# Patient Record
Sex: MALE | Race: WHITE
[De-identification: names, ages, dates, MRNs, and addresses within clinical notes are randomized per-mention and may not be internally consistent; named-entity substitution may affect disease eponyms.]

---

## 2020-11-20 ENCOUNTER — HOSPITAL ENCOUNTER (INPATIENT)
Dept: HOSPITAL 35 - SBH | Age: 85
LOS: 12 days | Discharge: ANOTHER HOSPITAL | DRG: 884 | End: 2020-12-02
Attending: PSYCHIATRY & NEUROLOGY | Admitting: PSYCHIATRY & NEUROLOGY
Payer: COMMERCIAL

## 2020-11-20 VITALS — SYSTOLIC BLOOD PRESSURE: 160 MMHG | DIASTOLIC BLOOD PRESSURE: 90 MMHG

## 2020-11-20 VITALS — BODY MASS INDEX: 21.38 KG/M2 | HEIGHT: 71 IN | WEIGHT: 152.7 LBS

## 2020-11-20 DIAGNOSIS — U07.1: ICD-10-CM

## 2020-11-20 DIAGNOSIS — I10: ICD-10-CM

## 2020-11-20 DIAGNOSIS — N39.0: ICD-10-CM

## 2020-11-20 DIAGNOSIS — F32.9: ICD-10-CM

## 2020-11-20 DIAGNOSIS — F03.91: Primary | ICD-10-CM

## 2020-11-20 DIAGNOSIS — R33.9: ICD-10-CM

## 2020-11-20 DIAGNOSIS — G47.00: ICD-10-CM

## 2020-11-20 DIAGNOSIS — F41.9: ICD-10-CM

## 2020-11-20 DIAGNOSIS — Z79.82: ICD-10-CM

## 2020-11-20 DIAGNOSIS — Z79.899: ICD-10-CM

## 2020-11-20 PROCEDURE — 10880: CPT

## 2020-11-20 NOTE — EKG
[facility]
[place]   [address]                     ELECTROCARDIOGRAM REPORT      
_______________________________________________________________________________
 
Name:       DARRICK COLON              Room #:                     PRE   
M.R.#:      [id number]                       Account #:      [id number]  
Admission:              Attend Phys:                          
Discharge:              Date of Birth:  [birth year]  
                                                          Report #: [id number]
                                                                    [id number]
_______________________________________________________________________________
THIS REPORT FOR:  
 
cc:                                
                                   
     Mervin Cedeño MD [facility]                                         ~
THIS REPORT FOR:   //name//                          
 
                         [facility] ED
                                       
Test Date:    2020               Test Time:    15:44:19
Pat Name:     DARRICK COLON         Department:   
Patient ID:   [id number]            Room:          
Gender:       M                        Technician:   
:          [id number]               Requested By: Theron Dong
Order Number: [id number] MD:   Mervin Cedeño
                                 Measurements
Intervals                              Axis          
Rate:         66                       P:            22
PA:           190                      QRS:          -37
QRSD:         115                      T:            25
QT:           439                                    
QTc:          460                                    
                           Interpretive Statements
Sinus rhythm
Nonspecific IVCD with LAD
Left ventricular hypertrophy
Artifact in lead(s) II,aVR,aVF,V1,V2,V3,V4,V5,V6
No previous ECG available for comparison
Electronically Signed On 2020 16:28:15 CST by Mervin Cedeño
[email or web address]
 
 
 
 
 
 
 
 
 
 
 
 
 
 
  <ELECTRONICALLY SIGNED>
   By: Mervin Cedeño MD, FACC    
  20     1628
D: 20 154                           _____________________________________
T: 20 154                           Mervin Cedeño MD, [facility]      /EPI

## 2020-11-21 VITALS — SYSTOLIC BLOOD PRESSURE: 148 MMHG | DIASTOLIC BLOOD PRESSURE: 92 MMHG

## 2020-11-21 VITALS — DIASTOLIC BLOOD PRESSURE: 97 MMHG | SYSTOLIC BLOOD PRESSURE: 164 MMHG

## 2020-11-21 VITALS — DIASTOLIC BLOOD PRESSURE: 85 MMHG | SYSTOLIC BLOOD PRESSURE: 122 MMHG

## 2020-11-21 LAB
ANION GAP SERPL CALC-SCNC: 12 MMOL/L (ref 7–16)
BUN SERPL-MCNC: 13 MG/DL (ref 7–18)
CALCIUM SERPL-MCNC: 8.6 MG/DL (ref 8.5–10.1)
CHLORIDE SERPL-SCNC: 101 MMOL/L (ref 98–107)
CO2 SERPL-SCNC: 24 MMOL/L (ref 21–32)
CREAT SERPL-MCNC: 1.1 MG/DL (ref 0.7–1.3)
ERYTHROCYTE [DISTWIDTH] IN BLOOD BY AUTOMATED COUNT: 13.8 % (ref 10.5–14.5)
GLUCOSE SERPL-MCNC: 94 MG/DL (ref 74–106)
HCT VFR BLD CALC: 35.8 % (ref 42–52)
HGB BLD-MCNC: 12 GM/DL (ref 14–18)
MCH RBC QN AUTO: 31 PG (ref 26–34)
MCHC RBC AUTO-ENTMCNC: 33.7 G/DL (ref 28–37)
MCV RBC: 92 FL (ref 80–100)
PLATELET # BLD: 175 THOU/UL (ref 150–400)
POTASSIUM SERPL-SCNC: 3.8 MMOL/L (ref 3.5–5.1)
RBC # BLD AUTO: 3.89 MIL/UL (ref 4.5–6)
SODIUM SERPL-SCNC: 137 MMOL/L (ref 136–145)
WBC # BLD AUTO: 7.6 THOU/UL (ref 4–11)

## 2020-11-21 NOTE — NUR
PT ARRIVED IN THE UNIT VIA CART ACCOMPANIED BY RN. PT TRANSFERED TO THE BED
WITH MINIMUM ASSIST. PT ALERT AND ORIENTED TO SELF WITH CONFUSION. PT ASSISTED
TO THE BATHROOM BUT NO RESULTS OBSERVED. PRIOR TO COMING TO THE ER, PT WAS
LIVING AT [facility] IN THE MEMORY UNIT AND WAS BEING TREATED FOR UTI.
PER ER REPORT, PT WAS GIVEN A DOSE OF IV ABT. PT IS A HIGH FALL RISK WITH A
RECENT FALL AT THE [facility]. NO DELUSIONS NOTED THIS FAR. PT REMAINS CONFUSED AND
BECAME COMBATIVE WHEN STAFF TRIED TO KEEP HIM IN BED. PT WAS PUT IN A BEST
CHAIR AND TAKEN TO THE DAY ROOM FOR CLOSE OBSERVATION. NO SIGNS OF SI/HI
NOTED. WILL CTA.

## 2020-11-21 NOTE — NUR
BP at start of shift 164/97, left arm, lying.  Patient refusing all PO meds at
this time.  BP re-checked manually on left arm, lying, 148/92.  Message left
with Eduar MALAGON.

## 2020-11-21 NOTE — NUR
Patient has shown increase in impulsive behaviors.  Toileted, provided fluids,
provided activity pad.  Patient now becoming aggressive by hitting, kicking,
yelling at staff.  Patient displaying unsteady gait and poor balance.
Refusing to remain seated.  Has required 1:1 supervision over the last 1 hour
to keep patient and other peers safe.  Spoke with Dr. Marie.  Orders obtained
for Geodon 10mg IM STAT and 1:1 while awake due to impulsive and assaultive
behaviors.

## 2020-11-21 NOTE — NUR
Med rec completed with MAR from facility that patient resides.  Patient
currently prescribed Metoprolol Tartrate 25mg po daily, hold for BP <110/60 or
pulse <60.  Orders obtained to continue from Dr. Marie.  Pharmacist called
stating that she does not feel comfortable continuing this medication due
to it being Metoprolol Tartrate vs Metoprolol Succinate and
would like for it to be clarified by MD during the day.

## 2020-11-21 NOTE — NUR
Alert and orientated to name only, states he likes to be called "Prasanna".
Denies SI/HI.  Agitated with alot of confused, Gnosticism focused speech at
beginning of shift but then calmed down and slept.  Became restless with alot
of urinary urgency without any results.  Small amts green stains with bloody
appearing spots on diapers.  Ativan and tylenol given mid afternoon with
moderate results.  Placed on 1:1 d/t extreme impulsitivity.  Currently
sleeping without s/o distress.  Breath sounds clear t/o.  Reg HR auscultated.
Color pink with brisk capillary refill and palpable peripheral pulses.  No
significant UO, bladder scan done which showed >630 cc.  Dr. Roque office
called for orders--reached a Dr. Pike who stated Dr. Roque was his
partner and that he was out of town.  Discussed lack of UO, stains on diaper,
UA from ER.  He inquired why psych/hospitalist was not addressing and then
stated that neither he or Dr. Roque had priviledges but suggested barnard, UA
with cx and cipro 250mg bid and hospitalist consult.  Dr. Cynthia gilbert, ordered
hospitalist consult.  Dr. Anup gilbert, ordered barnard placement and then came
and examined pt.  Barnard placed with minimal difficulty, 750 cc of red urine
returned with many small clots, ua sent to lab which was then discarded.
Antibiotics started per order.  Pt. sleeping comfortably after barnard
placement.  Active bowel sounds over soft round abdomen.  Moderate soft,
unformed brown stool per brief.  Able to walk independently with steady gait
but very impulsive and resistant at times to direction.  Took medication whole
with water.
 
Currently sleeping in room without s/o distress.

## 2020-11-22 VITALS — SYSTOLIC BLOOD PRESSURE: 157 MMHG | DIASTOLIC BLOOD PRESSURE: 82 MMHG

## 2020-11-22 VITALS — DIASTOLIC BLOOD PRESSURE: 56 MMHG | SYSTOLIC BLOOD PRESSURE: 104 MMHG

## 2020-11-22 NOTE — NUR
ACCEPTED CARE OF PATIENT FROM 7P-7A SHIFT. PT IS UP WITH 1 ASSIST WITH WALKER
TO DINING ROOM TO EAT. IS UNABLE TO FEED SELF AND IS ASSISTED BY STAFF. EATS
50%. ENC FLUIDS. PT IS CONFUSED A/0X 1. IS IMPULSIVE WITH THOUGHTS AND
ACTIVITY. HAS A KAPLAN TO DD WITH BLOODY DRAINAGE. HGB IS 12.1 AND IS ON ABT
FOR UTI. PT DAUGHTER CALLED AND UPDATE ON CARE GIVEN TO HER. TAKES HIS MEDS
CRUSHED IN APPLESAUCE WITHOUT DIFFICULTY. DR MILLER HERE ORDER TO IRRIGATE
KAPLAN WITH STERILE WATER OBTAINED AND 50CC STERILE WATER YENNY WELL. TO IRRIGATE
Q 3HR TILL CLEARS. PT INCONTINENT OF BM BUT INDICATES HE WANTS TO SIT ON
STOOL.

## 2020-11-22 NOTE — NUR
1815- pt is up in G/C color pink awake watching TV at times. b/p at 1750 is
89/47. taken in left arm is 84/41. at 1815 B/P is 104/56. PT alert calm
setting.evon continues to dd.

## 2020-11-22 NOTE — NUR
Patient woke up around 0230 and was attempting to pull out urinary catheter.
Staff observed patient had been incontinent of bowel.  Patient immediately
became physically aggressive by kicking and hitting staff with his fists.
Required staff x3 to complete timothy care and linen change.  Remained assaultive
while addressing toileting needs.  Education completed several times on need
to clean patient and to not pull on catheter.  Patient screaming, using foul
language.  Once timothy care completed, patient was able to rest in bed without
further issue.

## 2020-11-22 NOTE — NUR
PT. INCREASING IRRITABLY AS DAY GOES ON.  ABOUT 1200 AS LUNCH CAME, HE WAS
STATING IN AN ANGRY VOICE, "I AM NOT EATING THIS!  MY NAME IS [id number].
THAT IS NOT MY NAME.  HE REPEATED THIS FOR OVER AN HOUR.  HE KEPT SAYING,
"THEY JUST CAN'T SPELL IT".

## 2020-11-22 NOTE — NUR
1700-PT SETS AT TABLE TO EATS REQUIRES FEEDING BUT TRIES TO DRINK AND EAT A
FEW BITES ON OWN.TOOK AFTERNOON MEDS CRUSHED.PT CONTINUES TO HAVE KAPLAN TO DD
AND IT HAS BEEN IRRIGATED WITH 50CC STERILE WATER Q 3HRS. WITH RETURN OF RED
TINGED URINE. APPEARS LESS LINA RED COLOR AND 600CC URINE OUTPUT NOTED AND DR MILLER HERE AT [facility]ER TO SEE PT.PT IS RESTLESS IN CHAIR AT TIMES IS NOT
COMBATIVE CONTINUES TO BE A/O X 1.

## 2020-11-22 NOTE — NUR
DELMAR made several attempt to reach Pts daughter Rogers (DPOA) to complete
assessment.
 
Sunday at 3:47pm rogers contacted DELMAR and completed assessment.

## 2020-11-22 NOTE — NUR
PT CARE ASSUMED AT 1900. PT LAYING IN BED AT SHIFT CHANGE WITH NO SIGNS OF
DISTRESS. PT AGITATED DURING MED PASS AND DECLINED TO TAKE HIS HS MEDS
ADMINISTERED WHOLE. HE SPIT THEM OUT AND SAID, " NOT TONIGHT." THIS NURSE
ATTEMPTED TO GIVE PT CRUSHED MEDS IN APPLE SAUCE BUT PT REFUSED AND BECAME
AGITATED AND GETTING LOUD. PT CONFUSED AND DELUSIONAL. COMPLETE ASSESSMENT WAS
NOT DONE AS PT WAS AGITATED. WILL CTM.

## 2020-11-23 VITALS — DIASTOLIC BLOOD PRESSURE: 96 MMHG | SYSTOLIC BLOOD PRESSURE: 156 MMHG

## 2020-11-23 VITALS — SYSTOLIC BLOOD PRESSURE: 142 MMHG | DIASTOLIC BLOOD PRESSURE: 85 MMHG

## 2020-11-23 VITALS — DIASTOLIC BLOOD PRESSURE: 56 MMHG | SYSTOLIC BLOOD PRESSURE: 104 MMHG

## 2020-11-23 NOTE — NUR
DELMAR reviewed pt's chart and saw he resides at [facility] [phone]. DELMAR
contacted [facility] and got their fax number to send updates to them [phone].
 
DELMAR faxed updates.
 
SW team will continue to follow pt during his stay on this unit.

## 2020-11-23 NOTE — NUR
PATIENT HAS BEEN AGITATED ALL EVENING. PATIENT TAKEN FROM DINING ROOM TO BED
AROUND 2030 TONIGHT. HE CONTINUES TO BE 1:1 WA. HE HAS A KAPLAN CATHETER IN
THAT HE KEEPS PULLING ON AND URINE BAG IS BRIGHT RED FROM BLOOD. USED STERILE
WATER TO IRRIGATE AT THAT TIME. URINE STILL APPEARS RED TO DD INTO KAPLAN BAG.
REIRRIGATED LINE 3 HOURS LATER AND STILL ALOT OF BLOOD BUT URINE FLOW DID
INCREASE SOME. WATCHED PATIENT AS HE RESTED KEEPING HIS HANDS OUTSIDE OF
COVERS. CATHETER SECURED TO LEG WITH KERLIX.  AFTER SEVERAL MINUTES PATIENT
BEGAN PULLING FORCEFULLY ON CATHETER LINE STIMULATING BLOOD FLOW FROM THE
PENIS. TOOK 3 NURSES TO HOLD DOWN AND CLEAN WHILE THIS NURSE CALLED DR WEBBER STAT FOR ORDERS. ORDER GIVEN FOR GEODON 20MG IM STAT. INJECTION GIVEN
IN RIGHT THIGH. COVERS PLACED ON PATIENT AFTER TRYING TO CALM HIM DOWN. HE WAS
VERY COMBATIVE, HITTING, BITING, AND KICKING PEOPLE. KEPT ARMS ABOVE BLANKETS.
PCA WITH PATIENT 1:1 BESIDE BED AND LIGHTS TURNED OFF.  PATIENT IS CALMING AT
THIS TIME. CONTINUING TO MONITOR. BED IN LOW POSITION AND BED ALARM IS ON AND
1:1 WITH PATIENT.

## 2020-11-23 NOTE — NUR
ASSUMED CARE AT 0700 THIS MORNING.  PT. LYING IN BED WITH BLANKET OVER HIS
ARMS AND TORSO.  WHEN BLANKET WAS PEELED BACK, PT. WAS NOTED TO HAVE BOTH OF
HIS HANDS ON HIS URINARY CATHETER PULLING ON IT.  SMALL AMOUT OF BLOOD NOTED
AT THE HEAD OF HIS PENIS AT CATHETER INSERTION SITE.  STAFF FREED PT'S HANDS
FROM HIS CATHETER.  HIS UNDERWARE AND PANTS WAS PUT ON HIM AND HE WAS PLACED
IN RECLINING CHAIR AND TAKEN TO THE DINING ROOM.  PT. WAS CONTINOUSLY ANGRY
ATTEMPTING TO KICK AND HIT STAFF.  HE WAS CALLING STAFF "BITCH" AND "WITCH".
HE WAS CONTINOUSLY KICKING AND HITTING.  DR. WEBBER NOTIFIED OF THE SAME. HE
ORDERED GEODON 15 MG IM AND THIS WAS GIVEN AT 0920.  HE WENT TO SLEEP IN THE
RECLINGING CHAIR.  THIS RN APPROACHED HIM TO IRRIGATE HIS KAPLAN.  HE WOULD NOT
PERMIT THIS.  HE CONTINUALLY CURSED AT THIS WRITER, KICKING AND HITTING.  DR. WEBBER NOTIFIED OF INABILITY TO IRRIGATE KAPLAN.  DAUGHTER CALLED AND UPDATE
PROVIDED.  AFTER HE CALMED DOWN SOME, HIS CRUSHED MEDS IN APPLESAUCE WAS GIVEN
TO HIM.  HE DID EAT THIS.  HE STILL WOULD NOT PERMIT HIS KAPLAN FLUSHED.

## 2020-11-23 NOTE — NUR
Assumed care on 11/22/20 @ 19:15, seated in carola chair in the day room,
watching Chief's football game. Makes comments in which the first short
sentence seems pertinent to current activity, such as the football game, then
his next sentence is a confused combination of words and unrelated ideas.
Trying to hide his confusion and dementia. A&Ox2, not oriented to hospital and
purpose of hospitalization, not oriented to president. Confused as to his
catheter, saying, this is between you and your . Tries to pull catheter
out. removed stat lock from thigh. Catheter Flushed as per orders. Output is
red to burgandy in color. After flush is light clear pink. Awake much of the
night, talking to himself. Bed in low position, bed alarm set, will continue
to monitor as per [facility] unit protocol for safety and comfort.

## 2020-11-24 VITALS — DIASTOLIC BLOOD PRESSURE: 84 MMHG | SYSTOLIC BLOOD PRESSURE: 143 MMHG

## 2020-11-24 VITALS — SYSTOLIC BLOOD PRESSURE: 150 MMHG | DIASTOLIC BLOOD PRESSURE: 76 MMHG

## 2020-11-24 LAB
ANION GAP SERPL CALC-SCNC: 10 MMOL/L (ref 7–16)
BASOPHILS NFR BLD AUTO: 0.6 % (ref 0–2)
BUN SERPL-MCNC: 13 MG/DL (ref 7–18)
CALCIUM SERPL-MCNC: 9.1 MG/DL (ref 8.5–10.1)
CHLORIDE SERPL-SCNC: 105 MMOL/L (ref 98–107)
CO2 SERPL-SCNC: 29 MMOL/L (ref 21–32)
CREAT SERPL-MCNC: 1 MG/DL (ref 0.7–1.3)
EOSINOPHIL NFR BLD: 2.5 % (ref 0–3)
ERYTHROCYTE [DISTWIDTH] IN BLOOD BY AUTOMATED COUNT: 14.3 % (ref 10.5–14.5)
GLUCOSE SERPL-MCNC: 114 MG/DL (ref 74–106)
GRANULOCYTES NFR BLD MANUAL: 73.4 % (ref 36–66)
HCT VFR BLD CALC: 40.6 % (ref 42–52)
HGB BLD-MCNC: 13.3 GM/DL (ref 14–18)
LYMPHOCYTES NFR BLD AUTO: 13.2 % (ref 24–44)
MCH RBC QN AUTO: 30.5 PG (ref 26–34)
MCHC RBC AUTO-ENTMCNC: 32.9 G/DL (ref 28–37)
MCV RBC: 92.9 FL (ref 80–100)
MONOCYTES NFR BLD: 10.3 % (ref 1–8)
NEUTROPHILS # BLD: 4.4 THOU/UL (ref 1.4–8.2)
PLATELET # BLD: 179 THOU/UL (ref 150–400)
POTASSIUM SERPL-SCNC: 3.7 MMOL/L (ref 3.5–5.1)
RBC # BLD AUTO: 4.37 MIL/UL (ref 4.5–6)
SODIUM SERPL-SCNC: 144 MMOL/L (ref 136–145)
WBC # BLD AUTO: 6 THOU/UL (ref 4–11)

## 2020-11-24 NOTE — NUR
PATIENT RESTING IN BED. NO ACTIVE BLEEDING FROM URETHRA. DOES HAVE SCANT
LEAKAGE OF BLOOD WITH MANIPULATION OF PENIS. PATIENT WAS NON COMBATIVE AND
ALLOWED NURSE TO CHECK FOR BLEEDING AFTER BRIEF EXPLANATION OF WHY IT WAS
NEEDED. PATIENT BACK TO SLEEP. HE HAD 2.6 HOURS OF SLEEP LAST NIGHT. BED IN
LOW POSITION AND BED ALARM ON.

## 2020-11-24 NOTE — NUR
HAS REMAINED ON 1;1 SO FAR THIS SHIFT-INITIALLY RESISITVE WITH TAKING AM
MEDICATIONS CLOSING MOUTH TIGHTLY STATING "I DON'T WANT THAT" DID LATER TAKE
WITH PROMPTING AND ENCOURAGEMENT.
APPEARS TO BE HAVING VISUAL HALLUCINATIONS YELLING AT THIS NURSE LOUDLY "I AM
DYING TAKE THESE GLASS PEICES OFF-I AM COVERED IN CRUSHED GLASS. LATER IN
SHIFT REPORTS THAT HE IS COVERED INSHARDS OF GLASS AGAIN. WAS INCONTINENT OF
MODERATE AMOUNT RED TINGED URINE-BLADDER SCAN COMPLETED AT 1030 POST VOID AND
60 CC IN BLADDER PER SCAN

## 2020-11-24 NOTE — NUR
PATIENT CONTINUED TO PULL ON HIS KAPLAN CATHETER AFTER HAVING GEODON 20MG IM.
BLADDER SCANNED PATIENT AND KAPLAN IS DRAINING AND THERE WAS 4CC NOTED ON SCAN.
CALLED DR WEBBER AND RECEIVED ORDER TO DC CATHETER. PRN BLADDER SCAN IF
PATIENT HAS NOT VOIDED OR HAD INCONTINENCE. CATHETER REMOVED AND HAD A CLOT
THAT CAME OUT WITH IT. PATIENT HAS HAD SOME DRIBBLE OF URINE WITH BRIGHT RED
BLOOD. PATIENT WAS PLACED IN RECLINER AND MOVED TO THE DINING ROOM. HE WAS
COMBATIVE AND DID NOT WANT LAP RHONDA ON OR TO STAY SITTING IN RECLINER WITH
CHAIR ALARM ON. HE KEPT STATING HE WANTED TO STAND UP. STOOD PATIENT UP AND HE
SAT ON COUCH. OFFERED HIM ICE WATER AND HE SAT AND DRANK A CUP OF ICE WATER.
HE THEN SAID HE NEEDED TO FIND A TOILET. WITH ASSIST X1 WALKED PATIENT TO HIS
ROOM AND HE SAT ON TOILET. HE DID HAVE SMEARS OF STOOL BUT DID NOT VOID.
WALKED PATIENT AROUND UNIT AND BACK TO RECLINER. HE RECLINED FOR AN HOUR AND
WAS CALM AND COMPLIANT AND TALKED WITH NURSES. HE WAS TALKING TO HIMSELF AND
TO HIS WIFE HE WAS SEEING.  PATIENT THEN SAID HE WAS READY TO GO DOWN THE RICHEY
TO HIS ROOM AS HE WAS THINKING HE WAS IN HIS HOUSE, AND GO TO BED. 2 PCA'S
ASSISTED HIM TO HIS ROOM. HE DID NOT WANT TO USE THE BATHROOM. HE WENT TO BED
WITHOUT INCIDENT. BED IN LOW POSITION AND BED ALARM IS ON. CONTINUING TO
MONITOR FOR SAFETY AND STATUS OF PATIENT. LAUREN COSME NP DID COME BY TO
EVALUATE PATIENT STATUS AND TO SPEAK WITH HIM. SHE DISCUSSED POST KAPLAN
PROTOCOL WITH THIS NURSE. CONTINUING TO MONITOR.

## 2020-11-24 NOTE — NUR
PATIENT RESTING IN BED WITH EYES CLOSED. PATIENT HAS NOT VOIDED OR BEEN
INCONTINENT FOR 4 HOURS. BLADDER SCAN READING OF 31CC FOUND. REFUSED TO USE
TOILET. COMBATIVE THRU PROCEDURE. PATIENT CALMED AND BACK TO REST FOLLOWING
BLADDER SCAN.

## 2020-11-25 VITALS — SYSTOLIC BLOOD PRESSURE: 107 MMHG | DIASTOLIC BLOOD PRESSURE: 58 MMHG

## 2020-11-25 NOTE — NUR
11-24-20 CARE TRANSFERRED 1900 OBSERVED PT SUPINE IN BED WITH EYES OPEN. 1930
PT AAOX1, VSS, RR EVEN AND NONLABORED ON RA, PT REPORTS PAIN IN HIS JOINTS AND
SCORES AT 5 ON 0-10 SCALE. PT DENIES SI/HI. PT PRESENTS RESTLESS AND
COOPERATIVE. DURING MEDICATION ADMIN PT HAD NO DIFFICULTIES WITH CRUSHED
MEDICATION IN APPLESAUCE, BUT NOTED PT CHEEKED MEDICATION AND SPIT MEDICATION
ON FLOOR AND PT BECAME AGITATED AND YELLING, "THAT TASTE LIKE SHIT". PRN
MEDICATION WAS ADMIN AS PRESCRIBED. LATER NOTED PT RESTING LEFT SIDE WITH EYES
CLOSED IN BED. ZERO S/S OF ACUTE DISTRESS NOTED, PT WILL CONTINUE TO BE
MONITOR PER [facility] PROTOCOL

## 2020-11-25 NOTE — NUR
Alert and orientated to name only.  Calm, cooperative and compliant in AM,
able to take a few steps to sit in gerichair. Participating in group.
Denies SI/HI.  Became agitated
around lunchtime, using profanity and pushing against table.  5mg Olanzapine
given per R arm.  Breath sounds clear.  Reg HR auscultated.  Color pink with
brisk capillary refill and palpable peripheral pulses. Small amt dried bldy
drainage on brief, bladder scan done, >533.  Mostly cooperative with straight
cath for urine midmorning, 650 cc brown drainage.  Active bowel sounds over
soft, rounded abdomen.  Smear of brown stool this AM.
 
Cooperative with afternoon meds.  Sitting in dining room without s/o distress.

## 2020-11-26 VITALS — SYSTOLIC BLOOD PRESSURE: 153 MMHG | DIASTOLIC BLOOD PRESSURE: 84 MMHG

## 2020-11-26 VITALS — DIASTOLIC BLOOD PRESSURE: 71 MMHG | SYSTOLIC BLOOD PRESSURE: 155 MMHG

## 2020-11-26 NOTE — NUR
11-25-20 CARE TRANSFERRED AT 1900 OBSERVED PT SITTING IN [facility] WITH
LAPBUDDY IN PLACE. 2030 PT AAOX1, COMBATIVE AND CONFUSED REFUSING VS AND
NURSING ASSESSMENT. LATER PT REFUSED MEDICATION AND SECURITY WAS CALLED AND
PRN IM MEDICATION ADMIN IN RIGHT DELTOID. LATER PT WAS CALMER, BUT STILL
AGGRESSIVE. ASSISTED PT INTO BED AND NOTED BRIEF WITH YELLOW URINE, PERIAREA
CLEANED WITH SOAP, WATER AND PATTED DRY AND BERRIER CREAM. RESPONDED TO BED
ALARM AND PT WAS SITTING ON SIDE OF BED AND TRYING TO GET UP. LATER CHARGE RN
BROUGHT PT BACK OUT INTO DAY ROOM IN [facility] WITH LAPBUDDY IN PLACE. PT HAS
NO S/S OF ACUTE DISTRESS, PT WILL CONTINUE TO BE MONITOR.

## 2020-11-26 NOTE — NUR
1300 RESUMMED CARE FROM OVERNIGHT SHIFT THIS AM, PATIENT IN EBST CHAIR IN DAY
ROOM SLEEPING. PATIENT HAD IM OF OLANZAPINE LAST NIGHT PATIENT DID NOT EAT
MUCH OF HIS BREAKFAST. I CRUSHED PATIENTS MED AND PUT IT IN APPLESAUCE
PATIENT. PATIENTS ABDOMEN SOFT FLAT BOWEL SOUNDS PRESENT LUNGS CLEAR. PATIENT
IS ORIENTED TO SELF ONLY PATIENT IS NOT EATING OR DRINKING MUCH. HE DOES WAKE
UP AND INTERACT WITH THE STAFF. WILL CONTINUE TO MONITOR PATIENT FOR SAFETY
AND BEHAVIORS. WILL CONTINUE TO ENCOURAGE FLUIDS AND EATING.

## 2020-11-26 NOTE — NUR
RT Progress Note- Diaz's participation in the milieu and recreation groups
has been variable and fairly limited overall d/t his cognitive impairment and
impulsive behaviors. When Diaz is yelling out or experiencing
hallucinations he is disruptive to the milieu and requires 1;1 attention to
calm him down which hinders the group. However, when he is calm, Diaz is
able to follow along in simple exercises or reminiscing groups. CTRS will
continue to engage Diaz and encourage positive behavior.

## 2020-11-27 VITALS — DIASTOLIC BLOOD PRESSURE: 112 MMHG | SYSTOLIC BLOOD PRESSURE: 119 MMHG

## 2020-11-27 NOTE — NUR
ACCEPTED CARE OF PT FROM 7P- TO 7AM SHIFT. PT IS RESTING IN BED QUIETLY. IS
ASSISTED UP WITH MAX OF 2 STAFF TO G/C. TO DINING ROOM FOR BREAKFAST AND IS
FED HIS DIET BY STAFF. TAKES MEDS CRUSHED IN APPLESAUCE WELL. IS RESTLESS IN
CHAIR, YELLING OUT AND HITTING AT STAFF. ZYPREXA IM GIVEN IN LEFT DELTOID AT
10AM. PT HAS NUMEROUS BRUISES ON HIS ARMS, SKIN APPEARS THIN AND HAS A SKIN
TEAR ON LEFT FOREARM, COVERED WITH DRESSING. FEET UP WHILE IN CHAIR. DENIES
DISCOMFORT.PT DAUGHTER KAILYN CALLS TO GET UPDATE ON HIM.

## 2020-11-27 NOTE — NUR
Assumed care of pt @ 1900. Pt calm et cooperative this shift. Took medications
crushed in yogurt without difficulty. Was given dinner meal when asked for at
beginning of shift as pt had not eaten it on prior shift. Ate 90% of meal et
asked for snack when finished. Socialized in dayroom until HS. VSWNL. Health
assessment with no abnormalities noted at present time. Denies SI/HI/AVH at
present time. Currently resting in bed with eyes closed. Will continue to
monitor per unit protocol.

## 2020-11-27 NOTE — NUR
1800- PT HAS HAD GOOD RESULTS FROM ZYPREXA INJ HE RECIEVED EARLIER TODAY. HAS
NOT VOIDED AND WHEN ASSIST TO BED AND STRAIGHT CATH, 900CC DARK PO URINE
VIA STRAIGHT CATH OBTAINED. PT YENNY FAIR. HS CARE GIVEN AND ALICIA CARE GIVEN.

## 2020-11-28 VITALS — SYSTOLIC BLOOD PRESSURE: 131 MMHG | DIASTOLIC BLOOD PRESSURE: 81 MMHG

## 2020-11-28 VITALS — SYSTOLIC BLOOD PRESSURE: 136 MMHG | DIASTOLIC BLOOD PRESSURE: 99 MMHG

## 2020-11-28 NOTE — NUR
PT SITTING IN BEST CHAIR FOR BREAKFAST. PT STATED HE DIDN'T KNOW WHERE HE WAS
ONLY THAT HIS NAME IS MITCHELL. PT TOOK MEDS CRUSHED IN YOGART, MEDS THAT CAN'T BE
CRUSHED GAVE WHOLE WAS GIVEN IN YOGART AND HE CHEWED THEM UP. PT HAS LEFT SKIN
TEAR TO ELBOW. PT SKIN IS VERY THIN AND HAS NUMBEROUS OF BRUISES TO ARMS. PT
HAS TROUBLE URINATING ALSO, AND NEEDS STRAIGHT CATHED IF BLADDER SCANNED IS
OVER ORDERED AMOUNT.

## 2020-11-28 NOTE — NUR
PT GOT UP ON OWN AND WALKED IN THE RICHEY. STAFF ACCOMPANIED HIM TO BEST CHAIR
AND BACK TO DINNING ROOM.

## 2020-11-28 NOTE — NUR
BLADDER SCANNED AND SHOWED 416ML OF URINE IN BLADDER. AFTER BLADDER SCAN PT
STATED HE NEEDED TO PEE. PLACED PT ON COMMODE X2 PERSON AND HE SAT AND TRIED
TO URINATE. PT BRIEF HAD SMEAR OF OLD BLOOD AND CHANGED BRIEF AND ASSISTED
WITH PANTS.

## 2020-11-28 NOTE — NUR
Assumed care of pt @ 1900. Pt calm et cooperative this shift. Took medications
crushed in pudding without difficulty. Ambulates with assistance of
carola-chair. Refused vital signs this shift. Health assessment with no
abnormalities noted at present time. Unable to assess SI/HI/AVH this shift due
to increased confusion but does not demonstrate any signs or symptoms of acute
emotional distress at present time. Currently resting in bed with eyes closed.
Will continue to monitor per unit protocol.

## 2020-11-28 NOTE — NUR
PT FINISHED DINNER, HE NEEDED ASSISTANCE WITH FEEDING. PT TAKEN TO ROOM AND HE
STATED HE NEEDED TO PEE, SAT PT ON BSC AND HE WAS UNABLE TO VOID. STRAIGHT
CATH WITH 15 Yoruba KAPLAN AND GOT OUT 450ML OF TEA COLOR URINE AND ENDED WITH
BLOOD DROP AT THE END. PT DIDN'T TOLERATE WELL, HE SAID TAKE IT OUT WHEN GOING
INTO BLADDER. PT RELAXED AFTER URINE WAS DRAINING.

## 2020-11-28 NOTE — NUR
PT STATED HE WANTED TO LAY DOWN AND REST. TRANSFERED PT VIA X2 ASSIST TO LAY
DOWN FROM BEST CHAIR. BED ALARM ON FOR SAFETY.

## 2020-11-28 NOTE — NUR
BLADDER SCANNED PT AND SHOWED 159ML OF URINE, DAY PCA SAID HE WAS DRY THIS AM.
SCARED PT WHEN APPROACHED HIM, HE TRIED TO KISS THIS WRITER ON THE FORHEAD.

## 2020-11-29 VITALS — SYSTOLIC BLOOD PRESSURE: 164 MMHG | DIASTOLIC BLOOD PRESSURE: 92 MMHG

## 2020-11-29 VITALS — DIASTOLIC BLOOD PRESSURE: 71 MMHG | SYSTOLIC BLOOD PRESSURE: 137 MMHG

## 2020-11-29 NOTE — NUR
ASSUMED PT CARE AT 2030. PT LYING QUIETLY IN BED WITH EYES CLOSED. NO DISTRESS
NOTED AT THIS TIME. PT DROWSY BUT WAS EASILY AROUSABLE. PT TOOK HS MEDS
WITHOUT ANY DIFFICULTY. NO AGITATION EXHIBITED. UNABLE TO ASSESS PAIN AS PT
WAS UNABLE TO ANSWER ASSESSMENT QUESTIONS. LUNG SOUNDS CTA NEDA. HEART SOUNDS
NORMAL. BOWEL SOUNDS PRESENT AND DIMINISHED. SKIN BRUISED WITH COVERED SKIN
TEARS. NO EDEMA NOTED. WILL CTM.

## 2020-11-29 NOTE — NUR
PT SITTING IN DINING ROOM. ASSISTED PT WITH FEEDING. PT STATED HE LIKED THE
ORANGE JUICE. PT HAS BRUISES TO ARMS BILATERALY AND SKIN TEAR TO LEFT ELBOW
AND STERI-STRIPS TO LEFT FORARM. PT LUNGS CLEAR. PT DENIES ANY PAIN. PT IN
BEST CHAIR. PT TOOK MEDS IN YOGART. PT DID SPIT OUT WHOLE MEDS ON HIS FOOD
TRAY. PUT THE WHOLE PILLS IN YOGART AND TOLD PT THAT THERE WAS MEDS IN IT, AND
TILL SWALLOW THEM. HE CHEWED THE MEDS AND UNABLE TO UNDERSTAND ABOUT
SWALLOWING PILLS WHOLE.

## 2020-11-29 NOTE — NUR
PT TRYING TO HIS AND KICK STAFF. BLADDER SCANNED PT AND SHOWED 438ML OF URINE
IN BLADDER. NOTFIED DR. WEBBER ABOUT BEHAVIOR AND RETENTION. HE WILL PUT IN
ORDERS.

## 2020-11-29 NOTE — NUR
PT ROLLED TO ROOM AND WHEN TRYING TO TRANSFER PT TO BED X2 ASSIST HE SAID HE
WASN'T GOING TO GO ANYWHERE WITH DR. WEBBER. PT STARTED HITTING AND KICKING.
ADM GEODON 15MG IM TO LEFT ARM. PT THEN TRANSFERED TO BED. NEEDING ASSISTANCE
FOR STRAIGHT CATH, PT HELD DOWN X4 PERSONS TO STRAIGHT CATH. GOT OUT 700ML OF
TEA COLOER URINE WITH SMALL FLAKES OF BLOOD. PT SEEMED RESTFUL AFTER URINE
EVACUATED.

## 2020-11-29 NOTE — NUR
Assumed care of pt @ 1900. Pt calm et cooperative with pleasant demeanor this
shift. Took medications crushed in applesauce without difficulty. Ambulates
with assistance of gerichair. VSWNL. Health assessment with no abnormalities
noted at present time. Unable to assess SI/HI/AVH due to confusion but pt does
not demonstrate any signs or symptoms of acute emotional distress at present
time. [facility] Will continue
to monitor per unit protocol.

## 2020-11-30 VITALS — SYSTOLIC BLOOD PRESSURE: 165 MMHG | DIASTOLIC BLOOD PRESSURE: 50 MMHG

## 2020-11-30 VITALS — SYSTOLIC BLOOD PRESSURE: 132 MMHG | DIASTOLIC BLOOD PRESSURE: 67 MMHG

## 2020-11-30 NOTE — NUR
DELMAR contacted Lexy with [facility] to discuss hospice care for pt. No
answer. DELMAR left [facility].
 
DELMAR team will continue to follow pt during his stay on this unit.

## 2020-11-30 NOTE — NUR
Assess due to length of stay.  Admit to [facility] for dementia, behaviors,
agitation, delerium.  Usually tolerates heart healthy diet order well, eating
60-90%.  Refused breakfast this am however.  Wt hx not available.  Has healthy
BMI status of 21.3.  On vitamin C and D supplementation.  Presents at low
nutrition risk at this time

## 2020-12-01 VITALS — SYSTOLIC BLOOD PRESSURE: 91 MMHG | DIASTOLIC BLOOD PRESSURE: 50 MMHG

## 2020-12-01 LAB
BACTERIA-REFLEX: (no result) /HPF
BILIRUB UR-MCNC: NEGATIVE MG/DL
COLOR UR: YELLOW
KETONES UR STRIP-MCNC: (no result) MG/DL
MUCUS: (no result) STRN/LPF
RBC # UR STRIP: (no result) /UL
RBC #/AREA URNS HPF: (no result) /HPF (ref 0–2)
SP GR UR STRIP: 1.02 (ref 1–1.03)
SQUAMOUS: (no result) /LPF (ref 0–3)
URINE CLARITY: (no result)
URINE GLUCOSE-RANDOM*: NEGATIVE
URINE LEUKOCYTES-REFLEX: (no result)
URINE NITRITE-REFLEX: NEGATIVE
URINE PROTEIN (DIPSTICK): (no result)
URINE WBC-REFLEX: (no result) /HPF (ref 0–5)
UROBILINOGEN UR STRIP-ACNC: 1 E.U./DL (ref 0.2–1)

## 2020-12-01 NOTE — NUR
Yesterday DELMAR spoke with Allegra Capps with [facility] and provided an update
on pt. DELMAR discussed hospice. Allegra said they use [facility]. DELMAR told her she would
fax to her an order.
 
DELMAR faxed an order for hospice.
 
DELMAR team will continue to follow pt during his stay on this unit.

## 2020-12-01 NOTE — NUR
Assumed care on 11/20/20 @ 19:30, seated in a carola chair in the day room,
takes meds crushed in yogart. Retired to bed @ HS, is in bed but is restless,
sleeping off and on.

## 2020-12-01 NOTE — NUR
PATIENT CARE ASSUMED AT 0700 - SLEPT IN TILL 0900 - TEMPERATURE  WHEN
VITALS TAKEN - RETAKEN ONCE AWAKE AND WAS 98.6. NO COUGHING OBSERVED - PVR
ORDERED BY ATTENDING HOSPITALIST. WHEN PROCEDURE COMPLETED PATIENT  CC
OF RESIDUAL IN BLADDER. STRAIGHT CATH PERFORMED  CC OUTPUT DOCUMENTED.
PATIENT TOLERATED WELL - DR. WEBBER AND SHON GARNER MGR. ASSISTED WITH
PROCEDURE. PATIENT THEN ADMINISTERED MEDICATIONS IN PUDDING CRUSHED. LATER
PHYSICAL THERAPY HAD PATIENT UP AND WORKED WITH HIM. AFTERWARD SITUATED IN
DINING RICHEY AND LUNCH ADMINISTERED. PATIENT NEEDS CLOSE MONITORING WILL GET UP
AND HIGH FALL RISK BUT MANAGES TO AMBULATE SEVERAL FEET WITH VERY UNSTEADY
GAIT.

## 2020-12-02 ENCOUNTER — HOSPITAL ENCOUNTER (INPATIENT)
Dept: HOSPITAL 35 - 3W | Age: 85
LOS: 2 days | Discharge: NURSING, REHAB OR LONG-TERM CARE | DRG: 178 | End: 2020-12-04
Attending: INTERNAL MEDICINE | Admitting: INTERNAL MEDICINE
Payer: COMMERCIAL

## 2020-12-02 VITALS — DIASTOLIC BLOOD PRESSURE: 71 MMHG | SYSTOLIC BLOOD PRESSURE: 129 MMHG

## 2020-12-02 VITALS — DIASTOLIC BLOOD PRESSURE: 79 MMHG | SYSTOLIC BLOOD PRESSURE: 127 MMHG

## 2020-12-02 VITALS — WEIGHT: 152 LBS | HEIGHT: 70.98 IN | BODY MASS INDEX: 21.28 KG/M2

## 2020-12-02 VITALS — SYSTOLIC BLOOD PRESSURE: 127 MMHG | DIASTOLIC BLOOD PRESSURE: 79 MMHG

## 2020-12-02 DIAGNOSIS — F23: ICD-10-CM

## 2020-12-02 DIAGNOSIS — Z23: ICD-10-CM

## 2020-12-02 DIAGNOSIS — Z66: ICD-10-CM

## 2020-12-02 DIAGNOSIS — G47.00: ICD-10-CM

## 2020-12-02 DIAGNOSIS — E78.5: ICD-10-CM

## 2020-12-02 DIAGNOSIS — U07.1: Primary | ICD-10-CM

## 2020-12-02 DIAGNOSIS — I10: ICD-10-CM

## 2020-12-02 DIAGNOSIS — F01.51: ICD-10-CM

## 2020-12-02 DIAGNOSIS — R33.8: ICD-10-CM

## 2020-12-02 DIAGNOSIS — F41.9: ICD-10-CM

## 2020-12-02 DIAGNOSIS — F32.9: ICD-10-CM

## 2020-12-02 DIAGNOSIS — N40.1: ICD-10-CM

## 2020-12-02 DIAGNOSIS — Z79.899: ICD-10-CM

## 2020-12-02 PROCEDURE — 15000: CPT

## 2020-12-02 NOTE — NUR
12-2-20 CARE TRANSFERRED 1900 OBSERVED PT SITTING IN GERICHAIR WITH LAPBUDDY
IN PLACE IN DAY ROOM. 1955 PT DROWSY AAOX1, VSS, RR EVEN AND NONLABORED ON
RA. NO SELF HARM BEHAVIOR OBSERVED. PT % OF HS SNACK. 2126 RECEIVED
CALL FROM BERYL MILLS IN LAB ABOUT POSITIVE COVID PCR TEST RESULT. PT WAS
THEN PLACED IN PT ROOM, CHARGE RN CONTACTED HOUSE SUPERVISOR AND HCP FALLON WEBBER AND PT IS BEING DISCHARGED FROM [facility] AND WILL BE ADMITED TO 3RD FLOOR.
ZERO S/S OF ACUTE DISTRESS, PT WILL CONTINUE TO BE MONITOR UNTIL DISCHARGE
COMPLETE

## 2020-12-02 NOTE — NUR
12-1-20 CARE TRANSFERRED 1900 OBSERVED PT SITTING IN DAY ROOM WITH LAP BUDDING
IN PLACE WATCHING TV. PT AAOX1, VSS, RR EVEN AND NONLABORED ON RA, PT DENIES
SI/HI AND PAIN. PT R FOREARM SKIN TEARS STERI STRIPS AND BANDAGE C/D/T. PT ATE
100% OF HS SNACK. PT PRESENTED CONFUSED BUT COOPERATIVE THROUGHOUT NURSING
ASSESSMENT. DURING MEDICATION ADMIN PT HAD NO DIFFICULTIES. LATER ASSISTED PT
INTO BED AND PT BLADDER WAS SCANNED 173ML. THIS AM ASSISTED WITH CHANGING PT
BRIEF AND SMALL AMT OF YELLOW URINE IN BRIEF. ZERO S/S OF ACUTE DISTRESS
NOTED, PT WILL BE MONITOR PER [facility] PROTOCOL.

## 2020-12-02 NOTE — NUR
PATIENT WAS UP IN GERICHAIR, SITTING IN DAYROOM WHEN CARE ASSUMED. PATIENT IS
ALERT TO SELF, FORGETFUL, AND CONFUSED. PATIENT TOOK ALL MEDICATION WHOLE
WITHOUT DIFFICULTY. HE IS EATING MEALS, AND DRINKING FLUID FAIRLY WELL WITH
ASSIST OF STAFF. PATIENT DENIES SUICIDAL IDEATION, "AM FINE". UNABLE TO
APPROPRIATELY RESPOND TO FURTHER ASSESSMENT QUESTIONS DUE TO COGNITIVE
IMPAIRMENT. NO SIGN OF PAIN, OR ACUTE SIGN OF DISTRESS NOTED. BLADDER SCAN
COMPLETED THIS MORNING WITH RESULT 256ML. DR. WEBBER AWARE. GOT A CALL FROM
[facility] ([facility]) WHO STATES "DR. VELIZ SAID PUTTING S/P CATH
ON PATIENT IS NOT A GOOD IDEA, HE LEVI PULL IT OUT". DR. WEBBER NOTIFIED.

## 2020-12-02 NOTE — NUR
DELMAR faxed updates.
 
DELMAR was told by Dr. Cardona that pt will be ready for d/c tomorrow.
 
DELMAR contacted [facility] to speak to Allegra Capps about discharge of pt.
 
SW team will continue to follow pt during his stay on this unit.

## 2020-12-03 VITALS — DIASTOLIC BLOOD PRESSURE: 80 MMHG | SYSTOLIC BLOOD PRESSURE: 157 MMHG

## 2020-12-03 VITALS — SYSTOLIC BLOOD PRESSURE: 144 MMHG | DIASTOLIC BLOOD PRESSURE: 80 MMHG

## 2020-12-03 VITALS — SYSTOLIC BLOOD PRESSURE: 140 MMHG | DIASTOLIC BLOOD PRESSURE: 72 MMHG

## 2020-12-03 VITALS — SYSTOLIC BLOOD PRESSURE: 166 MMHG | DIASTOLIC BLOOD PRESSURE: 97 MMHG

## 2020-12-03 NOTE — NUR
PT WAS ADMITTED TO THE UNIT FROM [facility] AT 2345 IN A STABLE CONDITION.PT POSITIVE
FOR COVID,ENHANCED PRECAUTION MAINTAINED.BRUISING AND SKIN TEAR NOTED.PT REF
TO TAKE HIS PANTS OFF FOR SKIN ASSESSMENT.ADMISSION PARTIALLY DONE DUE TO PT
NOT ABLE TO PROVIDE INFO.PT RESTING ON HIS BED AT THIS TIME.CALL LIGHT WITHIN
REACH.

## 2020-12-03 NOTE — NUR
SW sent referrals to [facility]  for covid
positive placement in their skilled units.
 
SW team will continue to follow pt during stay on this unit.

## 2020-12-03 NOTE — NUR
DELMAR contacted [facility] and spoke to Allegra Capps about pt and his covid
status. She said they do not accept covid positive pts. SW asked her how long
pt has to quarantine before they accept. She said she believes it is the
recommended 14 days. DELMAR explained the new update is a recommendation of 10
days. Allegra said that she thinks residential guidelines are 14 days. DELMAR asked
her to find out for sure so she can provide an accurate update to Dr. Cardona. Allegra said ok.
 
SW team will continue to follow pt during his stay on this unit.

## 2020-12-03 NOTE — NUR
Alert to name only.  Calm and cooperative most of day, spent majority of time
in bed.  One episode where he got up and was walking around room in brief,
redirected easily to bathroom. Confused speech.  No speech/behavior suggestive
of SI/HI.  Breath sounds clear, no s/o resp distress.  Reg HR auscultated.
Color pink with brisk capillary refill and palpable peripheral pulses.  Dark
yellow urine per toilet and pads on bed X3.  Bladder scan done prior to last
void at approximatley 1715 which was >450, voided in toilet, repeat bladder
scan was 276.  Active bowl sounds over soft, rounded abdomen.  Steristrips on
forearm intact with scabbed blood over skin tear, optiform drsg removed.
Daughter called for update, returned phone call at approximately 1915,
appropriate questions and concerns.  Wants to know how long pt will be
quarrantined.  Suggested she call tomorrow after team meeting for plan.

## 2020-12-04 VITALS — SYSTOLIC BLOOD PRESSURE: 175 MMHG | DIASTOLIC BLOOD PRESSURE: 94 MMHG

## 2020-12-04 NOTE — NUR
Alert and orientated to name only this AM.  Calm and cooperative, took meds
crushed in apple sauce.  Some confused speech.  No speech/behavior
suggestive of SI/HI. Breath sounds clear.  No s/o resp distress.  Reg HR
auscultated.  Color pink with brisk capillary refill and palpable peripheral
pulses.  Feet cool to touch.  Incontinent of brown urine to pad/brief X3 this
AM.  Active bowel sounds over soft, rounded abdomen.  Steristrips removed from
skin tear on forearm, now scabbed over.  Unsteady gait when ambulating in room
to bathroom, needs assist X 1.  Report called to [facility] to
[facility]. Consent recieved via phone from daughter and DPOA Lucrecia Linares.
Transported via WC per [facility] to [facility] at about 1420.  No s/o
distress.

## 2020-12-04 NOTE — NUR
DELMAR received a call from Sandrita with [facility] stating that they can accept
pt. DELMAR and Sandrita arranged for a 1300 discharge.
 
DELMAR contacted Lucrecia Andrews and provided an update. She said she was unaware pt
had covid before last night. DELMAR apologized and explained that he appears to be
okay and his behaviors have been good.
 
DELMAR provided an update on pt's discharge to pt's nurse.
 
DELMAR team will continue to follow pt during his stay on this unit.

## 2020-12-04 NOTE — NUR
Assumed care of pt @ 1900. Pt calm et cooperative with pleasant demeanor this
shift. Took medications crushed in yogurt without difficulty. Ambulated in
room with very unsteady gait. VSWNL. Health assessment with no abnormalities
other than previously noted. Denies SI/HI/AVH but unsure if pt understood
questions. Pt does not demonstrate any signs or symptoms of any acute
emotional distress at present time. Currently resting in carola-chair with eyes
open. Will continue to monitor per unit protocol.

## 2020-12-04 NOTE — NUR
FAXED SCRIPT WRITTEN BY DR WEBBER REGARDING PT NOT IN RESTRAINTS PRIOR TO DC
TO DIMITRIS OF BR SPOKE WITH MARIA ELENA IN ADM SHE RECEIVED SCRIPT.

## 2020-12-13 ENCOUNTER — HOSPITAL ENCOUNTER (EMERGENCY)
Dept: HOSPITAL 35 - ER | Age: 85
Discharge: NURSING, REHAB OR LONG-TERM CARE | End: 2020-12-13
Payer: COMMERCIAL

## 2020-12-13 VITALS — WEIGHT: 139.99 LBS | BODY MASS INDEX: 21.22 KG/M2 | HEIGHT: 68 IN

## 2020-12-13 VITALS — SYSTOLIC BLOOD PRESSURE: 74 MMHG | DIASTOLIC BLOOD PRESSURE: 38 MMHG

## 2020-12-13 DIAGNOSIS — I10: ICD-10-CM

## 2020-12-13 DIAGNOSIS — R06.03: ICD-10-CM

## 2020-12-13 DIAGNOSIS — U07.1: Primary | ICD-10-CM

## 2020-12-13 DIAGNOSIS — Z79.899: ICD-10-CM
